# Patient Record
Sex: FEMALE | Race: WHITE | NOT HISPANIC OR LATINO | Employment: FULL TIME | URBAN - METROPOLITAN AREA
[De-identification: names, ages, dates, MRNs, and addresses within clinical notes are randomized per-mention and may not be internally consistent; named-entity substitution may affect disease eponyms.]

---

## 2023-06-26 PROBLEM — E66.01 OBESITY, MORBID, BMI 50 OR HIGHER: Status: ACTIVE | Noted: 2023-06-26

## 2023-06-26 PROBLEM — R07.9 CHEST PAIN: Status: ACTIVE | Noted: 2023-06-26

## 2023-06-26 PROBLEM — R00.2 PALPITATIONS: Status: ACTIVE | Noted: 2023-06-26

## 2023-09-22 ENCOUNTER — OFFICE VISIT (OUTPATIENT)
Dept: CARDIOLOGY | Facility: CLINIC | Age: 38
End: 2023-09-22
Payer: COMMERCIAL

## 2023-09-22 VITALS
BODY MASS INDEX: 52.08 KG/M2 | SYSTOLIC BLOOD PRESSURE: 125 MMHG | HEIGHT: 62 IN | HEART RATE: 103 BPM | DIASTOLIC BLOOD PRESSURE: 83 MMHG | OXYGEN SATURATION: 97 % | WEIGHT: 283 LBS

## 2023-09-22 DIAGNOSIS — E78.2 MIXED HYPERLIPIDEMIA: ICD-10-CM

## 2023-09-22 DIAGNOSIS — R00.2 PALPITATIONS: Primary | ICD-10-CM

## 2023-09-22 DIAGNOSIS — R07.9 CHEST PAIN, UNSPECIFIED TYPE: ICD-10-CM

## 2023-09-22 DIAGNOSIS — I10 PRIMARY HYPERTENSION: ICD-10-CM

## 2023-09-22 PROCEDURE — 99214 OFFICE O/P EST MOD 30 MIN: CPT | Performed by: INTERNAL MEDICINE

## 2023-09-22 RX ORDER — LORATADINE 10 MG/1
TABLET ORAL
COMMUNITY

## 2023-09-22 RX ORDER — METOPROLOL TARTRATE 50 MG/1
50 TABLET, FILM COATED ORAL DAILY
COMMUNITY
End: 2023-09-22

## 2023-09-22 NOTE — PROGRESS NOTES
Cardiology Office Visit      Encounter Date:  09/22/2023    Patient ID:   Sharri Franco is a 38 y.o. female.    Reason For Followup:  Palpitations    Brief Clinical History:  Dear Jeanna Lewis MD    I had the pleasure of seeing Sharri Franco today. As you are well aware, this is a 38 y.o. female here for evaluation of palpitations.  She has no past cardiac history.  2D echo 2/2022 showed an EF = 60-65% without significant VHD.  Cardiac monitor 2/2022 showed PACs and PVCs but no worrisome arrhythmias.  PMH includes obesity, HLD, pre-diabetic, GIANNI (compliant with CPAP), and GERD.  Patient's mother had a TIA in her late 50s/early 60s.       Patient tells me that she has seen Dr. Tate in the past for c/o palpitations.  She was initially on metoprolol and did not tolerate it well and switched to diltiazem.  She was also started on HCTZ for fluid retention.  She continues to have palpitations.  She reports wearing another 72 Holter in 1/2023 but only had brief fluttering sensations while wearing the monitor.  She has been recently feeling sensations of irregular heart beat accompanied by SOA, dizziness, and a feeling of unsteadiness.  She reports that she has a pulse oximeter that reads her heart rate up to 156 bpm at these times.  She is usually able to cough a few times to relieve the irregular heart beat.  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  She denies excessive caffeine consumption.  She further c/o intermittent left sided chest pain radiating to her left neck and through to her back at rest that can last 2 hours at a time.  Sometimes she has palpitations with this.  She denies SOA.  She is mostly sedentary.      Interval History:  Denies any further symptoms of chest pain shortness of breath dizziness or syncope  Palpitations have improved  Blood pressure readings are much better  Lost  some weight recently    Assessment & Plan    Impressions:  Palpitations likely sinus tachycardia some prior history of PAC PVC burden currently doing better with the Cardizem  Lower extremity edema most likely venous edema and also side effects of the calcium channel blocker currently improved with the diuretic therapy  Atypical chest pain with normal stress test  Normal LV systolic function  Obesity and sleep apnea  Hyperlipidemia currently on statin  Recommendations:  In future likely consider to switch patient from Cardizem to losartan and if the heart rate is still high it may be consider different beta-blocker other than metoprolol which she did not tolerate very well in the past  Need for regular exercise and weight loss reviewed and discussed with patient  In future consider vascular screening and calcium score  Continued aggressive risk factor modification  Work-up reviewed and discussed the patient  Follow-up in the office in 6 months            No results found for: GLUCOSE, BUN, CREATININE, EGFRRESULT, EGFR, BCR, K, CO2, CALCIUM, PROTENTOTREF, ALBUMIN, BILITOT, AST, ALT     No results found for this or any previous visit.     No results found for: CHOL, CHLPL, TRIG, HDL, LDL, LDLDIRECT   Results for orders placed during the hospital encounter of 07/11/23    Stress Test With Myocardial Perfusion One Day    Interpretation Summary    Exercise myocardial perfusion study shows moderate-sized mild intensity mostly fixed anterior wall perfusion defect with normal wall motion and wall thickening suggestive of most likely underlying attenuation artifact with no significant reversible ischemia    Left ventricular ejection fraction is normal (Calculated EF = 69%).    Impressions are consistent with a low risk study.   Results for orders placed during the hospital encounter of 07/11/23    Cardiac Event Monitor    Interpretation Summary  Extended Holter monitor study for symptoms of palpitations  Patient was monitored  "from July 11, 2023 to August 9, 2023  Underlying rhythm is sinus rhythm with a minimal heart rate of 50 beats per maximal heart rate of 143 beats minute average heart rate of 80 bpm  No atrial fibrillation  No sustained ventricular or supraventricular tachyarrhythmia  No clinically significant pauses  No AV block  Patient had multiple symptomatic episodes with symptoms of lightheadedness dizziness heart racing during these episodes patient noted to be in sinus rhythm or sinus tachycardia with a heart rate varying between 68bpm to 126 bpm with no significant cardiac arrhythmia  Relatively benign study  Clinical correlation is recommended           Objective:    Vitals:  Vitals:    09/22/23 1520   BP: 125/83   Pulse: 103   SpO2: 97%   Weight: 128 kg (283 lb)   Height: 157.5 cm (62\")       Physical Exam:    General: Alert, cooperative, no distress, appears stated age  Head:  Normocephalic, atraumatic, mucous membranes moist  Eyes:  Conjunctiva/corneas clear, EOM's intact     Neck:  Supple,  no adenopathy;      Lungs: Clear to auscultation bilaterally, no wheezes rhonchi rales are noted  Chest wall: No tenderness  Heart::  Regular rate and rhythm, S1 and S2 normal, no murmur, rub or gallop  Abdomen: Soft, non-tender, nondistended bowel sounds active  Extremities: No cyanosis, clubbing, or edema  Pulses: 2+ and symmetric all extremities  Skin:  No rashes or lesions  Neuro/psych: A&O x3. CN II through XII are grossly intact with appropriate affect      Allergies:  Allergies   Allergen Reactions    Erythromycin Itching and Rash    Oxycodone Itching and Rash       Medication Review:     Current Outpatient Medications:     ascorbic acid (VITAMIN C) 1000 MG tablet, , Disp: , Rfl:     aspirin 81 MG oral suspension, , Disp: , Rfl:     Cholecalciferol (Vitamin D3) 50 MCG (2000 UT) capsule, , Disp: , Rfl:     dilTIAZem XR (DILACOR XR) 180 MG 24 hr capsule, , Disp: , Rfl:     fluticasone (FLONASE) 50 MCG/ACT nasal spray, , Disp: , " Rfl:     hydroCHLOROthiazide (MICROZIDE) 12.5 MG capsule, , Disp: , Rfl:     loratadine (CLARITIN) 10 MG tablet, , Disp: , Rfl:     Misc Natural Products (Elderberry/Vitamin C/Zinc) chewable tablet, , Disp: , Rfl:     Multiple Vitamins-Minerals (Womens Multivitamin + Collagen) chewable tablet, , Disp: , Rfl:     omeprazole (priLOSEC) 20 MG capsule, , Disp: , Rfl:     potassium chloride (K-DUR,KLOR-CON) 10 MEQ CR tablet, , Disp: , Rfl:     rosuvastatin (CRESTOR) 10 MG tablet, , Disp: , Rfl:     cetirizine (zyrTEC) 10 MG tablet, , Disp: , Rfl:     montelukast (SINGULAIR) 10 MG tablet, , Disp: , Rfl:     Family History:  Family History   Problem Relation Age of Onset    Heart attack Mother        Past Medical History:  Past Medical History:   Diagnosis Date    Hyperlipidemia     Hypertension     Sleep apnea        Past surgical History:  Past Surgical History:   Procedure Laterality Date    ENDOSCOPY      LAPAROSCOPIC APPENDECTOMY      LAPAROTOMY OOPHERECTOMY         Social History:  Social History     Socioeconomic History    Marital status: Single   Tobacco Use    Smoking status: Never    Smokeless tobacco: Never   Vaping Use    Vaping Use: Never used   Substance and Sexual Activity    Alcohol use: Never    Drug use: Never       Review of Systems:  The following systems were reviewed as they relate to the cardiovascular system: Constitutional, Eyes, ENT, Cardiovascular, Respiratory, Gastrointestinal, Integumentary, Neurological, Psychiatric, Hematologic, Endocrine, Musculoskeletal, and Genitourinary. The pertinent cardiovascular findings are reported above with all other cardiovascular points within those systems being negative.    Diagnostic Study Review:     Current Electrocardiogram:  Procedures          NOTE: The following portions of the patient's history were reviewed and updated this visit as appropriate: allergies, current medications, past family history, past medical history, past social history, past  surgical history and problem list.

## 2024-03-22 ENCOUNTER — OFFICE VISIT (OUTPATIENT)
Dept: CARDIOLOGY | Facility: CLINIC | Age: 39
End: 2024-03-22
Payer: COMMERCIAL

## 2024-03-22 VITALS
BODY MASS INDEX: 52.08 KG/M2 | DIASTOLIC BLOOD PRESSURE: 80 MMHG | HEIGHT: 62 IN | OXYGEN SATURATION: 98 % | HEART RATE: 90 BPM | WEIGHT: 283 LBS | SYSTOLIC BLOOD PRESSURE: 128 MMHG

## 2024-03-22 DIAGNOSIS — I10 PRIMARY HYPERTENSION: Primary | ICD-10-CM

## 2024-03-22 DIAGNOSIS — R00.2 PALPITATIONS: ICD-10-CM

## 2024-03-22 PROCEDURE — 99214 OFFICE O/P EST MOD 30 MIN: CPT | Performed by: INTERNAL MEDICINE

## 2024-03-22 PROCEDURE — 93000 ELECTROCARDIOGRAM COMPLETE: CPT | Performed by: INTERNAL MEDICINE

## 2024-03-22 NOTE — PROGRESS NOTES
Cardiology Office Visit      Encounter Date:  03/22/2024    Patient ID:   Sharri Franco is a 38 y.o. female.    Reason For Followup:  Palpitations    Brief Clinical History:  Dear Jeanna Lewis MD    I had the pleasure of seeing Sharri Franco today. As you are well aware, this is a 38 y.o. female here for evaluation of palpitations.  She has no past cardiac history.  2D echo 2/2022 showed an EF = 60-65% without significant VHD.  Cardiac monitor 2/2022 showed PACs and PVCs but no worrisome arrhythmias.  PMH includes obesity, HLD, pre-diabetic, GIANNI (compliant with CPAP), and GERD.  Patient's mother had a TIA in her late 50s/early 60s.       Patient tells me that she has seen Dr. Tate in the past for c/o palpitations.  She was initially on metoprolol and did not tolerate it well and switched to diltiazem.  She was also started on HCTZ for fluid retention.  She continues to have palpitations.  She reports wearing another 72 Holter in 1/2023 but only had brief fluttering sensations while wearing the monitor.  She has been recently feeling sensations of irregular heart beat accompanied by SOA, dizziness, and a feeling of unsteadiness.  She reports that she has a pulse oximeter that reads her heart rate up to 156 bpm at these times.  She is usually able to cough a few times to relieve the irregular heart beat.  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  She denies excessive caffeine consumption.  She further c/o intermittent left sided chest pain radiating to her left neck and through to her back at rest that can last 2 hours at a time.  Sometimes she has palpitations with this.  She denies SOA.  She is mostly sedentary.      Interval History:  Denies any further symptoms of chest pain shortness of breath dizziness or syncope  Palpitations have improved  Blood pressure readings are much better  Lost  "some weight recently    Assessment & Plan    Impressions:  Palpitations likely sinus tachycardia some prior history of PAC PVC burden currently doing better with the Cardizem  Lower extremity edema most likely venous edema and also side effects of the calcium channel blocker currently improved with the diuretic therapy  Atypical chest pain with normal stress test  Normal LV systolic function  Obesity and sleep apnea  Hyperlipidemia currently on statin    Recommendations:  Continue current medical therapy with Cardizem hydrochlorothiazide 12.5 mg p.o. once a day patient is on potassium 10 meq p.o. once a day and Crestor 10 mg p.o. once a day  Need for regular exercise and weight loss re  mg p.o. once a dayviewed and discussed with patient  In future consider vascular screening and calcium score  Continued aggressive risk factor modification  Work-up reviewed and discussed the patient  Follow-up in the office in 12 months        Vitals:  Vitals:    03/22/24 1518   BP: 128/80   Pulse: 90   SpO2: 98%   Weight: 128 kg (283 lb)   Height: 157.5 cm (62\")       Physical Exam:    General: Alert, cooperative, no distress, appears stated age  Head:  Normocephalic, atraumatic, mucous membranes moist  Eyes:  Conjunctiva/corneas clear, EOM's intact     Neck:  Supple,  no adenopathy;      Lungs: Clear to auscultation bilaterally, no wheezes rhonchi rales are noted  Chest wall: No tenderness  Heart::  Regular rate and rhythm, S1 and S2 normal, no murmur, rub or gallop  Abdomen: Soft, non-tender, nondistended bowel sounds active  Extremities: No cyanosis, clubbing, or edema  Pulses: 2+ and symmetric all extremities  Skin:  No rashes or lesions  Neuro/psych: A&O x3. CN II through XII are grossly intact with appropriate affect              Lab Results   Component Value Date    BUN 13 06/14/2022    CREATININE 0.69 06/14/2022    BCR 18.8 06/14/2022    K 4.1 06/14/2022    CO2 27 06/14/2022    CALCIUM 9.7 06/14/2022        No results " "found for this or any previous visit.     No results found for: \"CHOL\", \"CHLPL\", \"TRIG\", \"HDL\", \"LDL\", \"LDLDIRECT\"   Results for orders placed during the hospital encounter of 07/11/23    Stress Test With Myocardial Perfusion One Day    Interpretation Summary    Exercise myocardial perfusion study shows moderate-sized mild intensity mostly fixed anterior wall perfusion defect with normal wall motion and wall thickening suggestive of most likely underlying attenuation artifact with no significant reversible ischemia    Left ventricular ejection fraction is normal (Calculated EF = 69%).    Impressions are consistent with a low risk study.   Results for orders placed during the hospital encounter of 07/11/23    Cardiac Event Monitor    Interpretation Summary  Extended Holter monitor study for symptoms of palpitations  Patient was monitored from July 11, 2023 to August 9, 2023  Underlying rhythm is sinus rhythm with a minimal heart rate of 50 beats per maximal heart rate of 143 beats minute average heart rate of 80 bpm  No atrial fibrillation  No sustained ventricular or supraventricular tachyarrhythmia  No clinically significant pauses  No AV block  Patient had multiple symptomatic episodes with symptoms of lightheadedness dizziness heart racing during these episodes patient noted to be in sinus rhythm or sinus tachycardia with a heart rate varying between 68bpm to 126 bpm with no significant cardiac arrhythmia  Relatively benign study  Clinical correlation is recommended           Objective:          Allergies:  Allergies   Allergen Reactions    Erythromycin Itching and Rash    Oxycodone Itching and Rash       Medication Review:     Current Outpatient Medications:     ascorbic acid (VITAMIN C) 1000 MG tablet, , Disp: , Rfl:     Cholecalciferol (Vitamin D3) 50 MCG (2000 UT) capsule, , Disp: , Rfl:     dilTIAZem XR (DILACOR XR) 180 MG 24 hr capsule, , Disp: , Rfl:     hydroCHLOROthiazide (MICROZIDE) 12.5 MG capsule, , " Disp: , Rfl:     loratadine (CLARITIN) 10 MG tablet, , Disp: , Rfl:     Misc Natural Products (Elderberry/Vitamin C/Zinc) chewable tablet, , Disp: , Rfl:     Multiple Vitamins-Minerals (Womens Multivitamin + Collagen) chewable tablet, , Disp: , Rfl:     omeprazole (priLOSEC) 20 MG capsule, , Disp: , Rfl:     potassium chloride (K-DUR,KLOR-CON) 10 MEQ CR tablet, , Disp: , Rfl:     rosuvastatin (CRESTOR) 10 MG tablet, , Disp: , Rfl:     Family History:  Family History   Problem Relation Age of Onset    Heart attack Mother        Past Medical History:  Past Medical History:   Diagnosis Date    Hyperlipidemia     Hypertension     Sleep apnea        Past surgical History:  Past Surgical History:   Procedure Laterality Date    ENDOSCOPY      LAPAROSCOPIC APPENDECTOMY      LAPAROTOMY OOPHERECTOMY         Social History:  Social History     Socioeconomic History    Marital status: Single   Tobacco Use    Smoking status: Never    Smokeless tobacco: Never   Vaping Use    Vaping status: Never Used   Substance and Sexual Activity    Alcohol use: Never    Drug use: Never       Review of Systems:  The following systems were reviewed as they relate to the cardiovascular system: Constitutional, Eyes, ENT, Cardiovascular, Respiratory, Gastrointestinal, Integumentary, Neurological, Psychiatric, Hematologic, Endocrine, Musculoskeletal, and Genitourinary. The pertinent cardiovascular findings are reported above with all other cardiovascular points within those systems being negative.    Diagnostic Study Review:     Current Electrocardiogram:    ECG 12 Lead    Date/Time: 3/22/2024 3:51 PM  Performed by: Eber Lindsey MD    Authorized by: Eber Lindsey MD  Comparison: compared with previous ECG   Similar to previous ECG  Rhythm: sinus rhythm  Rate: normal  BPM: 90  Conduction: conduction normal  QRS axis: normal  Other findings: non-specific ST-T wave changes    Clinical impression: abnormal EKG                NOTE:  The following portions of the patient's history were reviewed and updated this visit as appropriate: allergies, current medications, past family history, past medical history, past social history, past surgical history and problem list.

## 2025-03-21 ENCOUNTER — OFFICE VISIT (OUTPATIENT)
Dept: CARDIOLOGY | Facility: CLINIC | Age: 40
End: 2025-03-21
Payer: COMMERCIAL

## 2025-03-21 VITALS
OXYGEN SATURATION: 98 % | HEART RATE: 93 BPM | HEIGHT: 62 IN | BODY MASS INDEX: 53.18 KG/M2 | WEIGHT: 289 LBS | SYSTOLIC BLOOD PRESSURE: 134 MMHG | DIASTOLIC BLOOD PRESSURE: 85 MMHG

## 2025-03-21 DIAGNOSIS — I10 PRIMARY HYPERTENSION: ICD-10-CM

## 2025-03-21 DIAGNOSIS — R00.2 PALPITATIONS: Primary | ICD-10-CM

## 2025-03-21 PROCEDURE — 99214 OFFICE O/P EST MOD 30 MIN: CPT | Performed by: INTERNAL MEDICINE

## 2025-03-21 PROCEDURE — 93000 ELECTROCARDIOGRAM COMPLETE: CPT | Performed by: INTERNAL MEDICINE

## 2025-03-21 NOTE — PROGRESS NOTES
Cardiology Office Visit      Encounter Date:  03/21/2025    Patient ID:   Sharri Franco is a 39 y.o. female.  Reason For Followup:  Palpitations    Brief Clinical History:  Dear Jeanna Lewis MD    I had the pleasure of seeing Sharri Franco today. As you are well aware, this is a 39 y.o. female here for evaluation of palpitations.  She has no past cardiac history.  2D echo 2/2022 showed an EF = 60-65% without significant VHD.  Cardiac monitor 2/2022 showed PACs and PVCs but no worrisome arrhythmias.  PMH includes obesity, HLD, pre-diabetic, GIANNI (compliant with CPAP), and GERD.  Patient's mother had a TIA in her late 50s/early 60s.             Interval History:  Denies any further symptoms of chest pain shortness of breath dizziness or syncope  Palpitations have improved  Blood pressure readings are much better  Denies any new cardiac symptoms    Assessment & Plan    Impressions:  Palpitations likely sinus tachycardia some prior history of PAC PVC burden currently doing better with the Cardizem  Lower extremity edema most likely venous edema and also side effects of the calcium channel blocker currently improved with the diuretic therapy  Atypical chest pain with normal stress test  Normal LV systolic function  Obesity and sleep apnea  Hyperlipidemia currently on statin    Recommendations:  Continue current medical therapy with Cardizem hydrochlorothiazide 12.5 mg p.o. once a day patient is on potassium 10 meq p.o. once a day and Crestor 10 mg p.o. once a day  Need for regular exercise and weight loss re  mg p.o. once a dayviewed and discussed with patient  In future consider vascular screening and calcium score  Prior workup labs medications reviewed and discussed with patient  Regular exercise and need for weight loss reviewed and discussed patient  Follow-up with primary care physician  Continued aggressive risk factor modification  Work-up reviewed and discussed the patient  Follow-up in the  "office in 12 months        Vitals:  Vitals:    03/21/25 1529   BP: 134/85   Pulse: 93   SpO2: 98%   Weight: 131 kg (289 lb)   Height: 157.5 cm (62\")       Physical Exam:    General: Alert, cooperative, no distress, appears stated age  Head:  Normocephalic, atraumatic, mucous membranes moist  Eyes:  Conjunctiva/corneas clear, EOM's intact     Neck:  Supple,  no adenopathy;      Lungs: Clear to auscultation bilaterally, no wheezes rhonchi rales are noted  Chest wall: No tenderness  Heart::  Regular rate and rhythm, S1 and S2 normal, no murmur, rub or gallop  Abdomen: Soft, non-tender, nondistended bowel sounds active  Extremities: No cyanosis, clubbing, or edema  Pulses: 2+ and symmetric all extremities  Skin:  No rashes or lesions  Neuro/psych: A&O x3. CN II through XII are grossly intact with appropriate affect              Lab Results   Component Value Date    BUN 13 06/14/2022    CREATININE 0.69 06/14/2022    BCR 18.8 06/14/2022    K 4.1 06/14/2022    CO2 27 06/14/2022    CALCIUM 9.7 06/14/2022        No results found for this or any previous visit.     No results found for: \"CHOL\", \"CHLPL\", \"TRIG\", \"HDL\", \"LDL\", \"LDLDIRECT\"   Results for orders placed during the hospital encounter of 07/11/23    Stress Test With Myocardial Perfusion One Day    Interpretation Summary    Exercise myocardial perfusion study shows moderate-sized mild intensity mostly fixed anterior wall perfusion defect with normal wall motion and wall thickening suggestive of most likely underlying attenuation artifact with no significant reversible ischemia    Left ventricular ejection fraction is normal (Calculated EF = 69%).    Impressions are consistent with a low risk study.   Results for orders placed during the hospital encounter of 07/11/23    Cardiac Event Monitor    Interpretation Summary  Extended Holter monitor study for symptoms of palpitations  Patient was monitored from July 11, 2023 to August 9, 2023  Underlying rhythm is sinus " rhythm with a minimal heart rate of 50 beats per maximal heart rate of 143 beats minute average heart rate of 80 bpm  No atrial fibrillation  No sustained ventricular or supraventricular tachyarrhythmia  No clinically significant pauses  No AV block  Patient had multiple symptomatic episodes with symptoms of lightheadedness dizziness heart racing during these episodes patient noted to be in sinus rhythm or sinus tachycardia with a heart rate varying between 68bpm to 126 bpm with no significant cardiac arrhythmia  Relatively benign study  Clinical correlation is recommended           Objective:          Allergies:  Allergies   Allergen Reactions    Erythromycin Itching and Rash    Oxycodone Itching and Rash       Medication Review:     Current Outpatient Medications:     ascorbic acid (VITAMIN C) 1000 MG tablet, , Disp: , Rfl:     Cholecalciferol (Vitamin D3) 50 MCG (2000 UT) capsule, , Disp: , Rfl:     dilTIAZem XR (DILACOR XR) 180 MG 24 hr capsule, , Disp: , Rfl:     hydroCHLOROthiazide (MICROZIDE) 12.5 MG capsule, , Disp: , Rfl:     loratadine (CLARITIN) 10 MG tablet, , Disp: , Rfl:     Misc Natural Products (Elderberry/Vitamin C/Zinc) chewable tablet, , Disp: , Rfl:     Multiple Vitamins-Minerals (Womens Multivitamin + Collagen) chewable tablet, , Disp: , Rfl:     omeprazole (priLOSEC) 20 MG capsule, , Disp: , Rfl:     potassium chloride (K-DUR,KLOR-CON) 10 MEQ CR tablet, , Disp: , Rfl:     rosuvastatin (CRESTOR) 10 MG tablet, , Disp: , Rfl:     Family History:  Family History   Problem Relation Age of Onset    Heart attack Mother        Past Medical History:  Past Medical History:   Diagnosis Date    Hyperlipidemia     Hypertension     Sleep apnea        Past surgical History:  Past Surgical History:   Procedure Laterality Date    ENDOSCOPY      LAPAROSCOPIC APPENDECTOMY      LAPAROTOMY OOPHERECTOMY         Social History:  Social History     Socioeconomic History    Marital status: Single   Tobacco Use     Smoking status: Never    Smokeless tobacco: Never   Vaping Use    Vaping status: Never Used   Substance and Sexual Activity    Alcohol use: Never    Drug use: Never       Review of Systems:  The following systems were reviewed as they relate to the cardiovascular system: Constitutional, Eyes, ENT, Cardiovascular, Respiratory, Gastrointestinal, Integumentary, Neurological, Psychiatric, Hematologic, Endocrine, Musculoskeletal, and Genitourinary. The pertinent cardiovascular findings are reported above with all other cardiovascular points within those systems being negative.    Diagnostic Study Review:     Current Electrocardiogram:    ECG 12 Lead    Date/Time: 3/21/2025 3:56 PM  Performed by: Eber Lindsey MD    Authorized by: Eber Lindsey MD  Comparison: compared with previous ECG   Similar to previous ECG  Rhythm: sinus rhythm  Rate: normal  BPM: 93  Conduction: conduction normal  ST Segments: ST segments normal  T Waves: T waves normal  QRS axis: normal    Clinical impression: normal ECG                NOTE: The following portions of the patient's history were reviewed and updated this visit as appropriate: allergies, current medications, past family history, past medical history, past social history, past surgical history and problem list.